# Patient Record
Sex: MALE | Race: WHITE | NOT HISPANIC OR LATINO | URBAN - NONMETROPOLITAN AREA
[De-identification: names, ages, dates, MRNs, and addresses within clinical notes are randomized per-mention and may not be internally consistent; named-entity substitution may affect disease eponyms.]

---

## 2018-11-29 PROBLEM — H40.1232: Noted: 2018-11-29

## 2018-11-29 PROBLEM — H25.13: Noted: 2018-11-29

## 2018-11-29 PROBLEM — H04.123: Noted: 2018-11-29

## 2018-11-29 PROBLEM — H35.412: Noted: 2018-11-15

## 2018-11-29 PROBLEM — H01.021: Noted: 2018-11-29

## 2018-11-29 PROBLEM — H43.813: Noted: 2018-11-15

## 2018-11-29 PROBLEM — H10.421: Noted: 2018-11-15

## 2019-01-17 ENCOUNTER — IMPORTED ENCOUNTER (OUTPATIENT)
Dept: URBAN - NONMETROPOLITAN AREA CLINIC 1 | Facility: CLINIC | Age: 69
End: 2019-01-17

## 2019-01-17 PROCEDURE — 99213 OFFICE O/P EST LOW 20 MIN: CPT

## 2019-01-17 NOTE — PATIENT DISCUSSION
LTG OU:- Discussed diagnosis in detail with patient - VF done previously:  OD reliable with scattered defects. OS relible with central scotoma and scattered defects. - OCT done previously stable by GPA. - Gonio done previously grade IV with light pigment OU. [facility] done previously:   . - Optos done previously stable findings OU- IOP 17 OU- Cup to Disc noted at .7 OU- Continue Zioptan OU QHS- Continue Dorzolamide/Timolol OU BID as directed. - Will consider laser if pressures are increased at next visit - Continue to monitor every 4-6 months or PRN. Blepharitis OU - Discussed diagnosis in detail with patient - Recommend J&J baby shampoo to scrub lids- Continue Erythromcin ointment to lids daily patient self D/C due to irritating his OU - D/C Doxycycline patient has finished but doesnt feel that it helped  - Continue to monitor PVD OU: - Discussed findings of exam in detail with the patient. - The risk of retinal detachment in patients with PVDs was discussed with the patient and the warning signs of retinal detachment were carefully reviewed with the patient. - The patient was warned to return to the office or contact the ophthalmologist on call immediately if they experience signs of retinal detachment or changes in vision noted from today. - No retinal holes tears or detachments seen today- If patient notices any new floaters or curtains carter webbs over the weekend patient advised to call our after hours number patient understands. - Continue to monitor closely. 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 Safia Iglesias in the past but has been released back to our care as he has been stable for a long while. - Will monitor each visit to make sure there are no changes.  Cataracts OU- Discussed diagnosis in detail with patient- Discussed signs and symptoms of progression- Discussed UV protection- No treatment needed at this time - Continue to monitorAstigmatism/Presby- Discussed diagnosis in detail with patient- Continue to monitor; 's Notes: MR  6/15/17DFE  6/15/17Optos  11/29/18OCT 6/15/18Gonio 12/15/2017VF 12/15/2017PACH  12/15/16 - Yancy Maher

## 2019-01-31 NOTE — PATIENT DISCUSSION
Greater than 50% of exam spent in face to face dialogue with patient. I have explained that she has a small retinal tear in the right eye that will require laser treatment to prevent a retinal detachment which could require surgical repair. She understands the need for treatment and would like to proceed. Patient tolerated laser procedure well.

## 2019-01-31 NOTE — PROCEDURE NOTE: CLINICAL
PROCEDURE NOTE: Laser for Retinal Tear OD. Diagnosis: Retinal Tear without Detachment. Prior to laser, risks/benefits/alternatives to laser discussed including loss of vision, decreased peripheral and night vision, need for more laser and/or surgery and patient wished to proceed. Spot size: * um. Pulse power: * mW. Number of pulses: *. Patient tolerated procedure well. There were no complications. Post-op instructions given. Nell Fisher

## 2019-04-18 ENCOUNTER — IMPORTED ENCOUNTER (OUTPATIENT)
Dept: URBAN - NONMETROPOLITAN AREA CLINIC 1 | Facility: CLINIC | Age: 69
End: 2019-04-18

## 2019-04-18 PROCEDURE — 92014 COMPRE OPH EXAM EST PT 1/>: CPT

## 2019-04-18 PROCEDURE — 92083 EXTENDED VISUAL FIELD XM: CPT

## 2019-04-18 NOTE — PATIENT DISCUSSION
LTG OU:- Discussed diagnosis in detail with patient - VF done today Od shows Good field and normal and OS shows Good field and normal - OCT done previously stable by GPA. - Gonio done previously grade IV with light pigment OU. [facility] done previously:   . - Optos done previously stable findings OU- IOP 16 OU- Cup to Disc noted at .7 OU- Continue Zioptan OU QHS- Continue Dorzolamide/Timolol OU BID as directed. - Will consider laser if pressures are increased at next visit - Continue to monitor every 4-6 months or PRN. Blepharitis OU - Discussed diagnosis in detail with patient - Recommend J&J baby shampoo to scrub lids- Continue Erythromcin ointment to lids daily patient self D/C due to irritating his OU - D/C Doxycycline patient has finished but doesnt feel that it helped  - Continue to monitor PVD OU: - Discussed findings of exam in detail with the patient. - The risk of retinal detachment in patients with PVDs was discussed with the patient and the warning signs of retinal detachment were carefully reviewed with the patient. - The patient was warned to return to the office or contact the ophthalmologist on call immediately if they experience signs of retinal detachment or changes in vision noted from today. - No retinal holes tears or detachments seen today- If patient notices any new floaters or curtains carter webbs over the weekend patient advised to call our after hours number patient understands. - Continue to monitor closely. 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 Safia Iglesias in the past but has been released back to our care as he has been stable for a long while. - Will monitor each visit to make sure there are no changes.  Cataracts OU- Discussed diagnosis in detail with patient- Discussed signs and symptoms of progression- Discussed UV protection- No treatment needed at this time - Continue to monitorAstigmatism/Presby- Discussed diagnosis in detail with patient- Continue to monitor; 's Notes: MR  6/15/17DFE  6/15/17Optos  11/29/18OCT 6/15/18Gonio 12/15/2017VF 4/18/19PACH  12/15/16 - Yancy Maher

## 2019-08-15 ENCOUNTER — IMPORTED ENCOUNTER (OUTPATIENT)
Dept: URBAN - NONMETROPOLITAN AREA CLINIC 1 | Facility: CLINIC | Age: 69
End: 2019-08-15

## 2019-08-15 PROBLEM — H35.412: Noted: 2018-11-15

## 2019-08-15 PROBLEM — H40.1232: Noted: 2019-08-15

## 2019-08-15 PROBLEM — H25.13: Noted: 2018-11-29

## 2019-08-15 PROBLEM — H04.123: Noted: 2018-11-29

## 2019-08-15 PROBLEM — H10.421: Noted: 2018-11-15

## 2019-08-15 PROBLEM — H43.813: Noted: 2018-11-15

## 2019-08-15 PROBLEM — H01.021: Noted: 2019-08-15

## 2019-08-15 PROCEDURE — 99213 OFFICE O/P EST LOW 20 MIN: CPT

## 2019-08-15 NOTE — PATIENT DISCUSSION
Blepharitis OU - Discussed diagnosis in detail with patient - Recommend J&J baby shampoo to scrub lids- D/C Erythromycin ointment patient self D/C due to irritating his OU- Start using Vaseline at night time  - D/C Doxycycline patient has finished but doesn't feel that it helped  - Start Alrex BID - TID OD sample given today- Continue to monitor ------------------------------previous notes------------------------LTG OU:- Discussed diagnosis in detail with patient - VF done today Od shows Good field and normal and OS shows Good field and normal - OCT done previously stable by GPA. - Gonio done previously grade IV with light pigment OU. [facility] done previously:   . - Optos done previously stable findings OU- IOP 16 OU- Cup to Disc noted at .7 OU- Continue Zioptan OU QHS- Continue Dorzolamide/Timolol OU BID as directed. - Will consider laser if pressures are increased at next visit - Continue to monitor every 4-6 months or PRN. PVD OU: - Discussed findings of exam in detail with the patient. - The risk of retinal detachment in patients with PVDs was discussed with the patient and the warning signs of retinal detachment were carefully reviewed with the patient. - The patient was warned to return to the office or contact the ophthalmologist on call immediately if they experience signs of retinal detachment or changes in vision noted from today. - No retinal holes tears or detachments seen today- If patient notices any new floaters or curtains carter webs over the weekend patient advised to call our after hours number patient understands. - Continue to monitor closely. 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 Helena Arredondo in the past but has been released back to our care as he has been stable for a long while. - Will monitor each visit to make sure there are no changes.  Cataracts OU- Discussed diagnosis in detail with patient- Discussed signs and symptoms of progression- Discussed UV protection- No treatment needed at this time - Continue to monitorAstigmatism/Presby- Discussed diagnosis in detail with patient- Continue to monitor; 's Notes: MR  6/15/17DFE  6/15/17Optos  11/29/18OCT 6/15/18Gonio 12/15/2017VF 4/18/19PACH  12/15/16 - Suleman Negron

## 2019-08-22 ENCOUNTER — IMPORTED ENCOUNTER (OUTPATIENT)
Dept: URBAN - NONMETROPOLITAN AREA CLINIC 1 | Facility: CLINIC | Age: 69
End: 2019-08-22

## 2019-08-22 PROBLEM — H04.123: Noted: 2018-11-29

## 2019-08-22 PROBLEM — H43.813: Noted: 2018-11-15

## 2019-08-22 PROBLEM — H01.021: Noted: 2019-08-22

## 2019-08-22 PROBLEM — H40.1232: Noted: 2019-08-22

## 2019-08-22 PROBLEM — H25.13: Noted: 2018-11-29

## 2019-08-22 PROBLEM — H35.412: Noted: 2018-11-15

## 2019-08-22 PROBLEM — H10.421: Noted: 2018-11-15

## 2019-08-22 PROCEDURE — 92014 COMPRE OPH EXAM EST PT 1/>: CPT

## 2019-08-22 PROCEDURE — 92133 CPTRZD OPH DX IMG PST SGM ON: CPT

## 2019-08-22 NOTE — PATIENT DISCUSSION
Blepharitis OU improved:  - Discussed diagnosis in detail with patient - Improved from last visit. - Recommend J&J baby shampoo to scrub lids- Hx of Alrex Erythromycin ointment Doxycylcine and Vaseline use. - Hx of Azasite use worked well but not covered by insurance. - Continue to monitor PRN changesLTG OU:- Discussed diagnosis in detail with patient - VF done previously:  OD shows Good field and normal and OS shows Good field and normal - OCT done today stable with good RNFL OU. - Gonio done previously grade IV with light pigment OU. [facility] done previously:   . - Optos done previously stable findings OU- IOP 22 OD and 20 OS but has been on Alrex recently for irritation OU - Cup to Disc noted at .7 OU- Continue Zioptan OU QHS- Continue Dorzolamide/Timolol OU BID as directed. - Will consider laser if pressures are increased at next visit - Continue to monitor every 4-6 months or PRN. PVD OU: - Discussed findings of exam in detail with the patient. - The risk of retinal detachment in patients with PVDs was discussed with the patient and the warning signs of retinal detachment were carefully reviewed with the patient. - The patient was warned to return to the office or contact the ophthalmologist on call immediately if they experience signs of retinal detachment or changes in vision noted from today. - No retinal holes tears or detachments seen today- If patient notices any new floaters or curtains carter webs over the weekend patient advised to call our after hours number patient understands. - Continue to monitor closely. 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 Eliane Malone in the past but has been released back to our care as he has been stable for a long while. - Will monitor each visit to make sure there are no changes.  Cataracts OU- Discussed diagnosis in detail with patient- Discussed signs and symptoms of progression- Discussed UV protection- No treatment needed at this time - Continue to monitorAstigmatism/Presby- Discussed diagnosis in detail with patient- Continue to monitor; 's Notes: MR  6/15/17DFE  8/22/19Optos  11/29/18OCT disc 8/22/19Gonio 12/15/2017VF 4/18/19PACH  12/15/16 - Kayla Bryant

## 2020-01-09 ENCOUNTER — IMPORTED ENCOUNTER (OUTPATIENT)
Dept: URBAN - NONMETROPOLITAN AREA CLINIC 1 | Facility: CLINIC | Age: 70
End: 2020-01-09

## 2020-01-09 PROCEDURE — 92250 FUNDUS PHOTOGRAPHY W/I&R: CPT

## 2020-01-09 PROCEDURE — 92014 COMPRE OPH EXAM EST PT 1/>: CPT

## 2020-01-09 NOTE — PATIENT DISCUSSION
Blepharitis OU improved:  - Discussed diagnosis in detail with patient - Improved from last visit. - Recommend J&J baby shampoo to scrub lids- Hx of Alrex Erythromycin ointment Doxycylcine and Vaseline use. - Hx of Azasite use worked well but not covered by insurance. - Continue to monitor PRN changesLTG OU:- Discussed diagnosis in detail with patient - VF done previously:  OD shows Good field and normal and OS shows Good field and normal - OCT done previously stable with good RNFL OU. - Gonio done today grade IV with light pigment OU. [facility] done previously:   . - Optos done today stable findings OU- IOP 20 OD and 17 OS but has been on Alrex recently for irritation OU. Patient states that he has not used recently - Cup to Disc noted at .7 OU- Continue Zioptan OU QHS- Continue Dorzolamide/Timolol OU BID as directed. - Will consider laser if pressures are increased at next visit - Continue to monitor PVD OU: - Discussed findings of exam in detail with the patient. - The risk of retinal detachment in patients with PVDs was discussed with the patient and the warning signs of retinal detachment were carefully reviewed with the patient. - The patient was warned to return to the office or contact the ophthalmologist on call immediately if they experience signs of retinal detachment or changes in vision noted from today. - No retinal holes tears or detachments seen today- If patient notices any new floaters or curtains carter webs over the weekend patient advised to call our after hours number patient understands. - Continue to monitor closely. 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 Aylin Sommers in the past but has been released back to our care as he has been stable for a long while. - Will monitor each visit to make sure there are no changes.  Cataracts OU- Discussed diagnosis in detail with patient- Discussed signs and symptoms of progression- Discussed UV protection- No treatment needed at this time - Continue to monitorAstigmatism/Presby- Discussed diagnosis in detail with patient- Continue to monitor; 's Notes: MR  6/15/17DFE  8/22/19Optos  1/9/20OCT disc 8/22/19Gonio 1/9/20VF 4/18/19PACH  12/15/16 - Ashly Records

## 2020-05-04 ENCOUNTER — IMPORTED ENCOUNTER (OUTPATIENT)
Dept: URBAN - NONMETROPOLITAN AREA CLINIC 1 | Facility: CLINIC | Age: 70
End: 2020-05-04

## 2020-05-04 PROBLEM — H43.813: Noted: 2020-05-04

## 2020-05-04 PROBLEM — H40.1232: Noted: 2020-05-04

## 2020-05-04 PROBLEM — H25.13: Noted: 2020-05-04

## 2020-05-04 PROBLEM — H04.123: Noted: 2020-05-04

## 2020-05-04 PROBLEM — H35.412: Noted: 2020-05-04

## 2020-05-04 PROBLEM — S05.01XA: Noted: 2020-05-04

## 2020-05-04 PROBLEM — H01.021: Noted: 2020-05-04

## 2020-05-04 PROCEDURE — 99213 OFFICE O/P EST LOW 20 MIN: CPT

## 2020-05-04 NOTE — PATIENT DISCUSSION
Small corneal abrasion OD - Discussed diagnosis in detail with patient - No foreign body seen in OD today - Samples of Refresh Char calderon today - Continue to monitor -----------------------------------previous notes------------------------------Blepharitis OU improved:  - Discussed diagnosis in detail with patient - Improved from last visit. - Recommend J&J baby shampoo to scrub lids- Hx of Alrex Erythromycin ointment Doxycylcine and Vaseline use. - Hx of Azasite use worked well but not covered by insurance. - Continue to monitor PRN changesLTG OU:- Discussed diagnosis in detail with patient - VF done previously:  OD shows Good field and normal and OS shows Good field and normal - OCT done previously stable with good RNFL OU. - Gonio done today grade IV with light pigment OU. [facility] done previously:   . - Optos done today stable findings OU- IOP 20 OD and 17 OS but has been on Alrex recently for irritation OU. Patient states that he has not used recently - Cup to Disc noted at .7 OU- Continue Zioptan OU QHS- Continue Dorzolamide/Timolol OU BID as directed. - Will consider laser if pressures are increased at next visit - Continue to monitor PVD OU: - Discussed findings of exam in detail with the patient. - The risk of retinal detachment in patients with PVDs was discussed with the patient and the warning signs of retinal detachment were carefully reviewed with the patient. - The patient was warned to return to the office or contact the ophthalmologist on call immediately if they experience signs of retinal detachment or changes in vision noted from today. - No retinal holes tears or detachments seen today- If patient notices any new floaters or curtains carter webs over the weekend patient advised to call our after hours number patient understands. - Continue to monitor closely. 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 Lawanda Brewer in the past but has been released back to our care as he has been stable for a long while. - Will monitor each visit to make sure there are no changes.  Cataracts OU- Discussed diagnosis in detail with patient- Discussed signs and symptoms of progression- Discussed UV protection- No treatment needed at this time - Continue to monitorAstigmatism/Presby- Discussed diagnosis in detail with patient- Continue to monitor; 's Notes: MR  6/15/17DFE  8/22/19Optos  1/9/20OCT disc 8/22/19Gonio 1/9/20VF 4/18/19PACH  12/15/16 - Rachael De Jesus

## 2020-09-17 ENCOUNTER — IMPORTED ENCOUNTER (OUTPATIENT)
Dept: URBAN - NONMETROPOLITAN AREA CLINIC 1 | Facility: CLINIC | Age: 70
End: 2020-09-17

## 2020-09-17 PROBLEM — H43.813: Noted: 2020-09-17

## 2020-09-17 PROBLEM — H01.021: Noted: 2020-09-17

## 2020-09-17 PROBLEM — H25.13: Noted: 2020-09-17

## 2020-09-17 PROBLEM — H35.412: Noted: 2020-09-17

## 2020-09-17 PROBLEM — H25.813: Noted: 2021-07-19

## 2020-09-17 PROBLEM — H01.021: Noted: 2021-01-22

## 2020-09-17 PROBLEM — H40.1232: Noted: 2020-09-17

## 2020-09-17 PROBLEM — H35.412: Noted: 2021-07-19

## 2020-09-17 PROBLEM — H04.123: Noted: 2020-09-17

## 2020-09-17 PROBLEM — H25.813: Noted: 2021-01-22

## 2020-09-17 PROBLEM — H43.813: Noted: 2021-07-19

## 2020-09-17 PROBLEM — H35.412: Noted: 2021-01-22

## 2020-09-17 PROCEDURE — 92083 EXTENDED VISUAL FIELD XM: CPT

## 2020-09-17 PROCEDURE — 99213 OFFICE O/P EST LOW 20 MIN: CPT

## 2020-09-17 NOTE — PATIENT DISCUSSION
Blepharitis OU- Discussed diagnosis in detail with patient - Recommend J&J baby shampoo to scrub lids- Hx of Alrex Erythromycin ointment Doxycylcine and Vaseline use. - Hx of Azasite use worked well but not covered by insurance. Continue to monitorLTG OU:- Discussed diagnosis in detail with patient - VF done today OD shows Good field with Non Specific defects and OS shows Good field and Normal- OCT done previously stable with good RNFL OU. - Gonio done previously grade IV with light pigment OU. [facility] done previously:   . - Optos done today stable findings OU- IOP 20 OD and 19 OS - Cup to Disc noted at .7 OU- Continue Zioptan OU QHS- Continue Dorzolamide/Timolol OU BID as directed. - Continue to monitor PVD OU: - Discussed findings of exam in detail with the patient. - The risk of retinal detachment in patients with PVDs was discussed with the patient and the warning signs of retinal detachment were carefully reviewed with the patient. - The patient was warned to return to the office or contact the ophthalmologist on call immediately if they experience signs of retinal detachment or changes in vision noted from today. - No retinal holes tears or detachments seen today- If patient notices any new floaters or curtains carter webs over the weekend patient advised to call our after hours number patient understands. - Continue to monitor closely. 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 Von Cooks in the past but has been released back to our care as he has been stable for a long while. - Will monitor each visit to make sure there are no changes.  Cataracts OU- Discussed diagnosis in detail with patient- Discussed signs and symptoms of progression- Discussed UV protection- No treatment needed at this time - Continue to monitorAstigmatism/Presby- Discussed diagnosis in detail with patient- MR requested by patient today and new glasses RX given- Continue to monitor; 's Notes: MR  6/15/17DFE  8/22/19Optos  1/9/20OCT disc 8/22/19Gonio 1/9/20VF 9/17/20PACH  12/15/16 - Iris Granda

## 2020-12-02 NOTE — PATIENT DISCUSSION
Also, please do not hesitate to call us if you have any concerns not addressed by this information. Please call [phone] and we will do everything we can to help you during this period.

## 2020-12-02 NOTE — PATIENT DISCUSSION
Resume normal activity. Resume any medications that were discontinued for surgery. Stop cold compresses and apply hot compresses to affected lid(s) 2-3 times daily for one month, 5 minutes at a time. Artificial tears prn burning/itching/blurry vision. Wash incisions/ lash line once daily with baby shampoo.

## 2021-01-22 ENCOUNTER — IMPORTED ENCOUNTER (OUTPATIENT)
Dept: URBAN - NONMETROPOLITAN AREA CLINIC 1 | Facility: CLINIC | Age: 71
End: 2021-01-22

## 2021-01-22 PROCEDURE — 92133 CPTRZD OPH DX IMG PST SGM ON: CPT

## 2021-01-22 PROCEDURE — 99214 OFFICE O/P EST MOD 30 MIN: CPT

## 2021-01-22 NOTE — PATIENT DISCUSSION
LTG OU:Discussed diagnosis in detail with patient. Discussed the chronic progressive nature of this disease and various treatment options. Importance of good compliance with medications was emphasized. OCT done today; borderline superior NFL thinning OD and no NFL thinning OS. PACH done previously:   . IOP at 20 OU. Cup to Disc noted at .7 OU. Continue Zioptan OU QHS and Cosopt BID OU. Continue to monitor. RTC in 6 months with Optos and Gonio Blepharitis OUDiscussed diagnosis in detail with patient. Recommend J&J baby shampoo to scrub lids. Hx of Alrex Erythromycin ointment Doxycylcine and Vaseline use. Hx of Azasite use worked well but not covered by insurance. Continue to monitorPVD OU: Discussed findings of exam in detail with the patient. The risk of retinal detachment in patients with PVDs was discussed with the patient and the warning signs of retinal detachment were carefully reviewed with the patient. The patient was warned to return to the office or contact the ophthalmologist on call immediately if they experience signs of retinal detachment. Continue to monitor. Lattice degeneration OSDiscussed in detail with patient. Patient has seen Dr. Wilber Mckeon in the past but has been released back to our care as he has been stable for a long while. Will monitor each visit to make sure there are no changes. Combined Cataract OUDiscussed diagnosis with patient. Reviewed symptoms related to cataract progression. Discussed various treatment options with patient. No treatment required at this time. Continue to monitor.; 's Notes: MR  6/15/17DFE  1/22/21Optos  1/9/20OCT 1/22/21Gonio 1/9/20VF 9/17/20PACH  12/15/16 - Jayy Koch

## 2021-07-19 ENCOUNTER — IMPORTED ENCOUNTER (OUTPATIENT)
Dept: URBAN - NONMETROPOLITAN AREA CLINIC 1 | Facility: CLINIC | Age: 71
End: 2021-07-19

## 2021-07-19 ENCOUNTER — PREPPED CHART (OUTPATIENT)
Dept: URBAN - NONMETROPOLITAN AREA CLINIC 1 | Facility: CLINIC | Age: 71
End: 2021-07-19

## 2021-07-19 PROCEDURE — 99214 OFFICE O/P EST MOD 30 MIN: CPT

## 2021-07-19 PROCEDURE — 92020 GONIOSCOPY: CPT

## 2021-07-19 PROCEDURE — 92250 FUNDUS PHOTOGRAPHY W/I&R: CPT

## 2021-07-19 NOTE — PATIENT DISCUSSION
LTG OU:Discussed diagnosis in detail with patient. Discussed the chronic progressive nature of this disease and various treatment options. Importance of good compliance with medications was emphasized. Gonio done today; Grade IV with light pigment OU. Optos done today; glaucomatous cupping OU. OCT done previously; borderline superior NFL thinning OD and no NFL thinning OS. PACH done previously:   . IOP at 19 OD and 20 OS. Cup to Disc noted at .7 OU. Continue Zioptan OU QHS and Cosopt BID OU. Continue to monitor. RTC in 6 months with VF 24-2 and OCTBlepharitis OUDiscussed diagnosis in detail with patient. Recommend [employer] Lid Scrubs samples given today. Hx of Alrex Erythromycin ointment Doxycylcine and Vaseline use. Hx of Azasite use worked well but not covered by insurance. Continue to monitorPVD OU: Discussed findings of exam in detail with the patient. The risk of retinal detachment in patients with PVDs was discussed with the patient and the warning signs of retinal detachment were carefully reviewed with the patient. The patient was warned to return to the office or contact the ophthalmologist on call immediately if they experience signs of retinal detachment. Continue to monitor. Lattice degeneration OSDiscussed in detail with patient. Patient has seen Dr. Franci Canales in the past but has been released back to our care as he has been stable for a long while. Will monitor each visit to make sure there are no changes. Combined Cataract OUDiscussed diagnosis with patient. Reviewed symptoms related to cataract progression. Discussed various treatment options with patient. Recommend refer to Dr. Todd Shearer for evaluation. Patient defers at this time. Continue to monitor.; 's Notes: MR  9/17/20DFE  1/22/21Optos  7/19/21OCT 1/22/21Gonio 7/19/21VF 9/17/20PACH  12/15/16 - Teresa Mata

## 2022-01-24 ENCOUNTER — FOLLOW UP (OUTPATIENT)
Dept: URBAN - NONMETROPOLITAN AREA CLINIC 1 | Facility: CLINIC | Age: 72
End: 2022-01-24

## 2022-01-24 DIAGNOSIS — H52.4: ICD-10-CM

## 2022-01-24 DIAGNOSIS — H40.1232: ICD-10-CM

## 2022-01-24 PROCEDURE — 99214 OFFICE O/P EST MOD 30 MIN: CPT

## 2022-01-24 PROCEDURE — 92015 DETERMINE REFRACTIVE STATE: CPT

## 2022-01-24 PROCEDURE — 92083 EXTENDED VISUAL FIELD XM: CPT

## 2022-01-24 PROCEDURE — 92133 CPTRZD OPH DX IMG PST SGM ON: CPT

## 2022-01-24 ASSESSMENT — TONOMETRY
OS_IOP_MMHG: 25
OD_IOP_MMHG: 38

## 2022-01-24 ASSESSMENT — VISUAL ACUITY
OD_CC: 20/29-
OS_CC: 20/20-1

## 2022-01-24 NOTE — PATIENT DISCUSSION
Discussed diagnosis in detail with patient. Discussed the chronic, progressive nature of this disease and various treatment options. Importance of good compliance with medications was emphasized. Gonio done previously; Grade IV with light pigment OU. Optos done previously; glaucomatous cupping OU. OCT done today; no NFL thinning OU. PACH done previously: , . IOP increased to 38 OD and 24 OS due to non compliance. Cup to Disc noted at .7 OU. Restart Zioptan OU QHS and Cosopt BID OU, sample of Cosopt PF given today and Rx sent to pharmacy. Continue to monitor. RTC in 1-2 weeks for IOP check.

## 2022-01-24 NOTE — PATIENT DISCUSSION
Discussed diagnosis in detail with patient. Reviewed symptoms related to cataract progression. Recommend refer to Dr. Amanda Pemberton for evaluation. Patient defers at this time. Continue to monitor.

## 2022-02-14 ENCOUNTER — FOLLOW UP (OUTPATIENT)
Dept: URBAN - NONMETROPOLITAN AREA CLINIC 1 | Facility: CLINIC | Age: 72
End: 2022-02-14

## 2022-02-14 DIAGNOSIS — H40.1232: ICD-10-CM

## 2022-02-14 PROCEDURE — 99213 OFFICE O/P EST LOW 20 MIN: CPT

## 2022-02-14 ASSESSMENT — TONOMETRY
OD_IOP_MMHG: 27
OS_IOP_MMHG: 20

## 2022-02-14 ASSESSMENT — VISUAL ACUITY
OS_CC: 20/25-1
OD_CC: 20/30-2

## 2022-02-14 NOTE — PATIENT DISCUSSION
Discussed diagnosis in detail with patient. Reviewed symptoms related to cataract progression. Recommend refer to Dr. Juan Dominguez for evaluation. Patient defers at this time. Continue to monitor.

## 2022-02-14 NOTE — PATIENT DISCUSSION
Discussed diagnosis in detail with patient. Discussed the chronic, progressive nature of this disease and various treatment options. Importance of good compliance with medications was emphasized. IOP better at 27 from 38 OD and 20 from 24 OS due to non compliance. Cup to Disc noted at .7 OU. Continue Cosopt BID OU and Zioptan QHS OU (patient insurance will no longer cover [employer] but will finish until they are out) sent in Rx for Lumigan and was sent one bottle but the pharmacy states it was going to be over $600. Patient will finish out the [address] and will call when they need new Rx sent to pharmacy.

## 2022-04-15 ASSESSMENT — VISUAL ACUITY
OD_SC: 20/20-1
OD_SC: 20/30-2
OS_PH: 20/25-
OD_SC: 20/30-
OS_SC: 20/40+
OS_SC: 20/20
OS_CC: 20/20
OS_SC: 20/25-
OD_SC: 20/30
OS_SC: 20/25-2
OD_CC: 20/30+
OD_SC: 20/30-
OS_SC: 20/30+2
OD_SC: 20/30+
OS_SC: 20/30+
OD_SC: 20/20-
OS_SC: 20/20-
OD_SC: 20/32-
OS_SC: 20/29+

## 2022-04-15 ASSESSMENT — TONOMETRY
OS_IOP_MMHG: 17
OS_IOP_MMHG: 19
OS_IOP_MMHG: 20
OD_IOP_MMHG: 19
OD_IOP_MMHG: 20
OD_IOP_MMHG: 20
OD_IOP_MMHG: 22
OD_IOP_MMHG: 16
OD_IOP_MMHG: 20
OS_IOP_MMHG: 20
OS_IOP_MMHG: 19
OD_IOP_MMHG: 20
OS_IOP_MMHG: 20
OS_IOP_MMHG: 16

## 2022-04-15 ASSESSMENT — PACHYMETRY
OS_CT_UM: 548; ADJ: THIN
OD_CT_UM: 534; ADJ: THIN
OS_CT_UM: 548; ADJ: THIN
OD_CT_UM: 534; ADJ: THIN
OS_CT_UM: 548; ADJ: THIN
OD_CT_UM: 534; ADJ: THIN
OS_CT_UM: 548; ADJ: THIN
OD_CT_UM: 534; ADJ: THIN
OD_CT_UM: 534; ADJ: THIN
OS_CT_UM: 548; ADJ: THIN
OS_CT_UM: 548; ADJ: THIN
OD_CT_UM: 534; ADJ: THIN
OD_CT_UM: 534; ADJ: THIN

## 2022-05-13 ENCOUNTER — EMERGENCY VISIT (OUTPATIENT)
Dept: URBAN - NONMETROPOLITAN AREA CLINIC 1 | Facility: CLINIC | Age: 72
End: 2022-05-13

## 2022-05-13 DIAGNOSIS — H40.1232: ICD-10-CM

## 2022-05-13 PROCEDURE — 99213 OFFICE O/P EST LOW 20 MIN: CPT

## 2022-05-13 ASSESSMENT — VISUAL ACUITY
OD_PH: 20/30-1
OD_CC: 20/50-1
OS_CC: 20/40-1
OS_PH: 20/30

## 2022-05-13 ASSESSMENT — TONOMETRY
OS_IOP_MMHG: 18
OD_IOP_MMHG: 18

## 2022-05-13 NOTE — PATIENT DISCUSSION
Discussed diagnosis in detail with patient. Reviewed symptoms related to cataract progression. Recommend refer to Dr. Arcadio Vasquez for evaluation. Patient defers at this time. Continue to monitor.

## 2022-08-29 ENCOUNTER — FOLLOW UP (OUTPATIENT)
Dept: URBAN - NONMETROPOLITAN AREA CLINIC 1 | Facility: CLINIC | Age: 72
End: 2022-08-29

## 2022-08-29 DIAGNOSIS — H25.813: ICD-10-CM

## 2022-08-29 DIAGNOSIS — H40.1232: ICD-10-CM

## 2022-08-29 PROCEDURE — 92020 GONIOSCOPY: CPT

## 2022-08-29 PROCEDURE — 99214 OFFICE O/P EST MOD 30 MIN: CPT

## 2022-08-29 PROCEDURE — 92250 FUNDUS PHOTOGRAPHY W/I&R: CPT

## 2022-08-29 ASSESSMENT — VISUAL ACUITY
OD_PH: 20/25-1
OS_CC: 20/25
OD_CC: 20/40-2

## 2022-08-29 ASSESSMENT — TONOMETRY
OS_IOP_MMHG: 22
OD_IOP_MMHG: 22

## 2022-08-29 NOTE — PATIENT DISCUSSION
Discussed diagnosis in detail with patient. Reviewed symptoms related to cataract progression. Recommend refer to Dr. Ramya Daily for evaluation. Patient defers at this time. Continue to monitor.

## 2022-11-10 ENCOUNTER — FOLLOW UP (OUTPATIENT)
Dept: URBAN - NONMETROPOLITAN AREA CLINIC 1 | Facility: CLINIC | Age: 72
End: 2022-11-10

## 2022-11-10 DIAGNOSIS — H40.1232: ICD-10-CM

## 2022-11-10 DIAGNOSIS — H25.813: ICD-10-CM

## 2022-11-10 DIAGNOSIS — H43.813: ICD-10-CM

## 2022-11-10 PROCEDURE — 99213 OFFICE O/P EST LOW 20 MIN: CPT

## 2022-11-10 ASSESSMENT — VISUAL ACUITY
OS_CC: 20/25
OD_CC: 20/30-2

## 2022-11-10 ASSESSMENT — TONOMETRY
OD_IOP_MMHG: 22
OD_IOP_MMHG: 24
OS_IOP_MMHG: 20
OS_IOP_MMHG: 22

## 2022-11-10 NOTE — PATIENT DISCUSSION
Discussed diagnosis in detail with patient. Reviewed symptoms related to cataract progression. Recommend refer to Dr. Shawn Rosado for evaluation. Patient defers at this time. Continue to monitor.

## 2023-04-19 ENCOUNTER — CONSULTATION/EVALUATION (OUTPATIENT)
Dept: URBAN - NONMETROPOLITAN AREA CLINIC 1 | Facility: CLINIC | Age: 73
End: 2023-04-19

## 2023-04-19 DIAGNOSIS — H25.813: ICD-10-CM

## 2023-04-19 PROCEDURE — 99214 OFFICE O/P EST MOD 30 MIN: CPT

## 2023-04-19 ASSESSMENT — VISUAL ACUITY
OS_PH: 20/20
OS_CC: 20/25
OS_SC: 20/25
OD_PAM: 20/20
OD_SC: 20/100
OD_BAT: 20/100
OD_CC: 20/50
OS_BAT: 20/30
OS_CC: 20/20
OD_CC: 20/20
OD_PH: 20/32
OS_AM: 20/20

## 2023-04-19 ASSESSMENT — TONOMETRY
OD_IOP_MMHG: 25
OS_IOP_MMHG: 25

## 2023-05-31 ENCOUNTER — CONSULTATION/EVALUATION (OUTPATIENT)
Dept: URBAN - NONMETROPOLITAN AREA CLINIC 1 | Facility: CLINIC | Age: 73
End: 2023-05-31

## 2023-05-31 DIAGNOSIS — H40.1232: ICD-10-CM

## 2023-05-31 DIAGNOSIS — H25.813: ICD-10-CM

## 2023-05-31 PROCEDURE — 92134 CPTRZ OPH DX IMG PST SGM RTA: CPT

## 2023-05-31 PROCEDURE — 99214 OFFICE O/P EST MOD 30 MIN: CPT

## 2023-05-31 ASSESSMENT — VISUAL ACUITY
OD_BAT: 20/100
OS_AM: 20/20
OD_SC: 20/200
OD_SC: 20/40
OD_PH: 20/60
OS_BAT: 20/50
OS_SC: 20/50
OD_PAM: 20/20
OS_SC: 20/25+2

## 2023-05-31 ASSESSMENT — TONOMETRY
OS_IOP_MMHG: 21
OD_IOP_MMHG: 22

## 2023-08-02 ENCOUNTER — PRE-OP/H&P (OUTPATIENT)
Dept: URBAN - NONMETROPOLITAN AREA CLINIC 1 | Facility: CLINIC | Age: 73
End: 2023-08-02

## 2023-08-02 VITALS
WEIGHT: 190 LBS | HEIGHT: 72 IN | BODY MASS INDEX: 25.73 KG/M2 | SYSTOLIC BLOOD PRESSURE: 102 MMHG | HEART RATE: 88 BPM | DIASTOLIC BLOOD PRESSURE: 68 MMHG

## 2023-08-02 DIAGNOSIS — I10: ICD-10-CM

## 2023-08-02 DIAGNOSIS — R51.9: ICD-10-CM

## 2023-08-02 DIAGNOSIS — F03.90: ICD-10-CM

## 2023-08-02 DIAGNOSIS — K21.9: ICD-10-CM

## 2023-08-02 DIAGNOSIS — Z01.818: ICD-10-CM

## 2023-08-02 PROCEDURE — 99213 OFFICE O/P EST LOW 20 MIN: CPT

## 2023-08-08 ENCOUNTER — SURGERY/PROCEDURE (OUTPATIENT)
Dept: URBAN - NONMETROPOLITAN AREA CLINIC 2 | Facility: CLINIC | Age: 73
End: 2023-08-08

## 2023-08-08 DIAGNOSIS — H25.812: ICD-10-CM

## 2023-08-08 PROCEDURE — 99199PCV PROF CUSTOM VISION PACKAGE

## 2023-08-08 PROCEDURE — 66984CV REMOVE CATARACT, INSERT LENS, CUSTOM VISION

## 2023-08-08 PROCEDURE — 65772LRI LRI DURING CAT SX

## 2023-08-08 PROCEDURE — 66999LNX LENSX

## 2023-08-08 PROCEDURE — 68841 INSJ RX ELUT IMPLT LAC CANAL: CPT

## 2023-08-09 ENCOUNTER — POST OP/EVAL OF SECOND EYE (OUTPATIENT)
Dept: URBAN - NONMETROPOLITAN AREA CLINIC 1 | Facility: CLINIC | Age: 73
End: 2023-08-09

## 2023-08-09 DIAGNOSIS — H25.811: ICD-10-CM

## 2023-08-09 PROCEDURE — 99213 OFFICE O/P EST LOW 20 MIN: CPT

## 2023-08-09 ASSESSMENT — VISUAL ACUITY
OD_PAM: 20/20
OD_CC: 20/40
OS_PH: 20/30
OD_BAT: 20/100
OD_CC: 20/60-1
OS_SC: 20/50-1
OD_SC: 20/200

## 2023-08-09 ASSESSMENT — TONOMETRY
OS_IOP_MMHG: 26
OD_IOP_MMHG: 23

## 2023-08-15 ENCOUNTER — POST-OP (OUTPATIENT)
Dept: URBAN - NONMETROPOLITAN AREA CLINIC 1 | Facility: CLINIC | Age: 73
End: 2023-08-15

## 2023-08-15 DIAGNOSIS — Z98.42: ICD-10-CM

## 2023-08-15 PROCEDURE — 99024 POSTOP FOLLOW-UP VISIT: CPT

## 2023-08-15 ASSESSMENT — TONOMETRY
OS_IOP_MMHG: 23
OD_IOP_MMHG: 24

## 2023-08-15 ASSESSMENT — VISUAL ACUITY
OS_PH: 20/40
OD_PH: 20/50
OD_SC: 20/100
OS_SC: 20/63+2

## 2023-08-29 ENCOUNTER — SURGERY/PROCEDURE (OUTPATIENT)
Dept: URBAN - NONMETROPOLITAN AREA CLINIC 1 | Facility: CLINIC | Age: 73
End: 2023-08-29

## 2023-08-29 DIAGNOSIS — H25.811: ICD-10-CM

## 2023-08-29 PROCEDURE — 99199PCV PROF CUSTOM VISION PACKAGE

## 2023-08-29 PROCEDURE — 66999LNX LENSX

## 2023-08-29 PROCEDURE — 68841 INSJ RX ELUT IMPLT LAC CANAL: CPT

## 2023-08-29 PROCEDURE — 66984CV REMOVE CATARACT, INSERT LENS, CUSTOM VISION

## 2023-08-29 PROCEDURE — 65772LRI LRI DURING CAT SX

## 2023-08-30 ENCOUNTER — POST-OP (OUTPATIENT)
Dept: URBAN - NONMETROPOLITAN AREA CLINIC 1 | Facility: CLINIC | Age: 73
End: 2023-08-30

## 2023-08-30 DIAGNOSIS — Z98.42: ICD-10-CM

## 2023-08-30 DIAGNOSIS — Z98.41: ICD-10-CM

## 2023-08-30 PROCEDURE — 99024 POSTOP FOLLOW-UP VISIT: CPT

## 2023-08-30 ASSESSMENT — TONOMETRY
OS_IOP_MMHG: 18
OD_IOP_MMHG: 32

## 2023-08-30 ASSESSMENT — VISUAL ACUITY
OD_SC: 20/63+1
OS_SC: 20/29
OD_PH: 20/50+1

## 2023-09-05 ENCOUNTER — POST-OP (OUTPATIENT)
Dept: URBAN - NONMETROPOLITAN AREA CLINIC 1 | Facility: CLINIC | Age: 73
End: 2023-09-05

## 2023-09-05 DIAGNOSIS — Z98.42: ICD-10-CM

## 2023-09-05 DIAGNOSIS — Z98.41: ICD-10-CM

## 2023-09-05 PROCEDURE — 99024 POSTOP FOLLOW-UP VISIT: CPT

## 2023-09-05 ASSESSMENT — VISUAL ACUITY
OS_SC: 20/22+
OU_SC: 20/22
OD_SC: 20/32-

## 2023-09-05 ASSESSMENT — TONOMETRY
OS_IOP_MMHG: 16
OD_IOP_MMHG: 18

## 2023-10-02 ENCOUNTER — POST-OP (OUTPATIENT)
Dept: URBAN - NONMETROPOLITAN AREA CLINIC 1 | Facility: CLINIC | Age: 73
End: 2023-10-02

## 2023-10-02 DIAGNOSIS — H40.1232: ICD-10-CM

## 2023-10-02 DIAGNOSIS — H52.4: ICD-10-CM

## 2023-10-02 DIAGNOSIS — Z98.42: ICD-10-CM

## 2023-10-02 DIAGNOSIS — Z98.41: ICD-10-CM

## 2023-10-02 PROCEDURE — 99024 POSTOP FOLLOW-UP VISIT: CPT

## 2023-10-02 PROCEDURE — 92015 DETERMINE REFRACTIVE STATE: CPT

## 2023-10-02 ASSESSMENT — TONOMETRY
OS_IOP_MMHG: 18
OD_IOP_MMHG: 18

## 2023-10-02 ASSESSMENT — VISUAL ACUITY
OD_PH: 20/29-1
OD_SC: 20/40
OS_SC: 20/25-1
OU_SC: 20/22-1

## 2024-01-08 ENCOUNTER — FOLLOW UP (OUTPATIENT)
Dept: URBAN - NONMETROPOLITAN AREA CLINIC 1 | Facility: CLINIC | Age: 74
End: 2024-01-08

## 2024-01-08 DIAGNOSIS — H40.1232: ICD-10-CM

## 2024-01-08 DIAGNOSIS — H43.813: ICD-10-CM

## 2024-01-08 PROCEDURE — 92250 FUNDUS PHOTOGRAPHY W/I&R: CPT

## 2024-01-08 PROCEDURE — 99214 OFFICE O/P EST MOD 30 MIN: CPT

## 2024-01-08 ASSESSMENT — TONOMETRY
OD_IOP_MMHG: 13
OS_IOP_MMHG: 13

## 2024-01-08 ASSESSMENT — VISUAL ACUITY
OS_SC: 20/22-1
OD_SC: 20/29+2

## 2024-03-11 ENCOUNTER — FOLLOW UP (OUTPATIENT)
Dept: URBAN - NONMETROPOLITAN AREA CLINIC 1 | Facility: CLINIC | Age: 74
End: 2024-03-11

## 2024-03-11 DIAGNOSIS — H43.813: ICD-10-CM

## 2024-03-11 DIAGNOSIS — H40.1232: ICD-10-CM

## 2024-03-11 PROCEDURE — 99214 OFFICE O/P EST MOD 30 MIN: CPT

## 2024-03-11 PROCEDURE — 92020 GONIOSCOPY: CPT

## 2024-03-11 ASSESSMENT — VISUAL ACUITY
OS_SC: 20/22-2
OU_SC: 20/20-1
OD_SC: 20/25-2

## 2024-03-11 ASSESSMENT — TONOMETRY
OD_IOP_MMHG: 15
OS_IOP_MMHG: 15

## 2024-09-13 ENCOUNTER — FOLLOW UP (OUTPATIENT)
Dept: URBAN - NONMETROPOLITAN AREA CLINIC 1 | Facility: CLINIC | Age: 74
End: 2024-09-13

## 2024-09-13 DIAGNOSIS — Z96.1: ICD-10-CM

## 2024-09-13 DIAGNOSIS — H40.1232: ICD-10-CM

## 2024-09-13 DIAGNOSIS — H43.813: ICD-10-CM

## 2024-09-13 PROCEDURE — 92133 CPTRZD OPH DX IMG PST SGM ON: CPT

## 2024-09-13 PROCEDURE — 99214 OFFICE O/P EST MOD 30 MIN: CPT

## 2025-01-02 ENCOUNTER — EMERGENCY VISIT (OUTPATIENT)
Age: 75
End: 2025-01-02

## 2025-01-02 DIAGNOSIS — H04.123: ICD-10-CM

## 2025-01-02 DIAGNOSIS — H01.025: ICD-10-CM

## 2025-01-02 DIAGNOSIS — H01.022: ICD-10-CM

## 2025-01-02 PROCEDURE — 99213 OFFICE O/P EST LOW 20 MIN: CPT

## 2025-03-14 ENCOUNTER — FOLLOW UP (OUTPATIENT)
Age: 75
End: 2025-03-14

## 2025-03-14 DIAGNOSIS — H01.022: ICD-10-CM

## 2025-03-14 DIAGNOSIS — H04.123: ICD-10-CM

## 2025-03-14 DIAGNOSIS — H01.025: ICD-10-CM

## 2025-03-14 DIAGNOSIS — H43.813: ICD-10-CM

## 2025-03-14 DIAGNOSIS — H40.1232: ICD-10-CM

## 2025-03-14 PROCEDURE — 92250 FUNDUS PHOTOGRAPHY W/I&R: CPT

## 2025-03-14 PROCEDURE — 99214 OFFICE O/P EST MOD 30 MIN: CPT
